# Patient Record
Sex: FEMALE | Race: BLACK OR AFRICAN AMERICAN | NOT HISPANIC OR LATINO
[De-identification: names, ages, dates, MRNs, and addresses within clinical notes are randomized per-mention and may not be internally consistent; named-entity substitution may affect disease eponyms.]

---

## 2022-08-12 ENCOUNTER — APPOINTMENT (OUTPATIENT)
Dept: COLORECTAL SURGERY | Facility: CLINIC | Age: 57
End: 2022-08-12

## 2022-08-12 VITALS
TEMPERATURE: 98.7 F | OXYGEN SATURATION: 100 % | HEART RATE: 75 BPM | SYSTOLIC BLOOD PRESSURE: 151 MMHG | DIASTOLIC BLOOD PRESSURE: 82 MMHG | BODY MASS INDEX: 27.15 KG/M2 | HEIGHT: 67 IN | WEIGHT: 173 LBS

## 2022-08-12 DIAGNOSIS — K62.9 DISEASE OF ANUS AND RECTUM, UNSPECIFIED: ICD-10-CM

## 2022-08-12 DIAGNOSIS — K63.5 POLYP OF COLON: ICD-10-CM

## 2022-08-12 PROBLEM — Z00.00 ENCOUNTER FOR PREVENTIVE HEALTH EXAMINATION: Status: ACTIVE | Noted: 2022-08-12

## 2022-08-12 PROCEDURE — 99204 OFFICE O/P NEW MOD 45 MIN: CPT

## 2022-08-12 RX ORDER — DULAGLUTIDE 1.5 MG/.5ML
1.5 INJECTION, SOLUTION SUBCUTANEOUS
Refills: 0 | Status: ACTIVE | COMMUNITY

## 2022-08-12 RX ORDER — ATORVASTATIN CALCIUM 10 MG/1
10 TABLET, FILM COATED ORAL
Refills: 0 | Status: ACTIVE | COMMUNITY

## 2022-08-12 RX ORDER — OLMESARTAN MEDOXOMIL, AMLODIPINE BESYLATE AND HYDROCHLOROTHIAZIDE 20; 5; 12.5 MG/1; MG/1; MG/1
20-5-12.5 TABLET, FILM COATED ORAL
Refills: 0 | Status: ACTIVE | COMMUNITY

## 2022-08-12 NOTE — PHYSICAL EXAM
[Skin Tags] : residual hemorrhoidal skin tags were noted [Normal] : was normal [None] : there was no rectal mass  [Normal Breath Sounds] : Normal breath sounds [Normal Heart Sounds] : normal heart sounds [2+] : left 2+ [No Rash or Lesion] : No rash or lesion [Alert] : alert [Oriented to Person] : oriented to person [Oriented to Place] : oriented to place [Oriented to Time] : oriented to time [Calm] : calm [Abdomen Masses] : No abdominal masses [Abdomen Tenderness] : ~T No ~M abdominal tenderness [Excoriation] : no perianal excoriation [Fistula] : no fistulas [Wart] : no warts [Ulcer ___ cm] : no ulcers [Tender, Swollen] : nontender, non-swollen [Thrombosed] : that was not thrombosed [Stool Sample Taken] : no stool obtained on rectal exam [Gross Blood] : no gross blood [de-identified] : benign, no masss, tenderness [de-identified] : several skin tags.  the posterior tag has a white 2 x 3 mm lesion (? wart), few other skin tags, digital - no masses, tone WNL. No anoscopy done at pts request [de-identified] : possible posterior external wart on skin tag [de-identified] : NAD

## 2022-08-12 NOTE — HISTORY OF PRESENT ILLNESS
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 Multiple other polyps found (8 mm right colon and 5 mm transverse, multiple small rectal polyps of which a few were removed). Path is pending  Also has sigmoid diverticulosis.\par First C scope was 5 years ago.  BM's daily.  No blood.  Frequent BM after some foods.  No hemorroid prolapse reported but may have skin tag. \par \par \par Dad had colon cancer at age 62.  Grand mom with stomach cancer.   Brother  of lung cancer at age 61.   \par \par \par PSH\par BTL\par Hysterectomy (MIS) and BSO for fibroids\par no anorectal surgery\par \par \par PMH\par cardiac: high cholesterol, +HTN.  No angina, MI, palpitations\par pulm:  stopped smoking in , no asthma, boronchitis, PNA\par DM x 1-2 years (type 2)\par no hepatitis, PUD\par no renal stones, UTI, hematuria, no dysuria\par neuro: no TIA, Seizure, or CVA\par no heavy bleeding or healing issues\par Mammograms this year.  \par Not seen GYN recenlty\par \par \par Meds\par trulicity\par olesartan\par atorvastatin\par \par \par \par Allergy: NKDA\par \par \par

## 2022-08-12 NOTE — ASSESSMENT
[FreeTextEntry1] : Right colon neoplasm.  Path pending.  if cancer then R isela needed.  If adenoma then option for advanced scope removal exists.  In that case propose colonoscopy on table and possible ESD/EMR vs right colectomy.\par Risks and benefits discussed with patient.  Risks of bleeding (? transfusion), infection (abscess, leak, etc), medical or other surgical complications discussed.  Possible need for laparoscopy to check for perforation i(f polyp fully removed) via scope but perforation a concern. \par If final path after scope removal shows cancer then colectomy at later date might be needed.  \par R colectomy and polypectomy web site information given to patient.\par Medical clearance requested.\par \par R/o anal wart on posterior skin tag.  If possible would excise the skin tag and that ? lesion at same surgery.\par Given perioperative anti cancer section (cimetidine, green tea, milk thistle, etc.\par

## 2022-08-16 ENCOUNTER — NON-APPOINTMENT (OUTPATIENT)
Age: 57
End: 2022-08-16

## 2022-09-08 RX ORDER — NEOMYCIN SULFATE 500 MG/1
500 TABLET ORAL
Qty: 6 | Refills: 0 | Status: DISCONTINUED | COMMUNITY
Start: 2022-09-06 | End: 2022-09-08

## 2022-09-13 ENCOUNTER — APPOINTMENT (OUTPATIENT)
Dept: COLORECTAL SURGERY | Facility: HOSPITAL | Age: 57
End: 2022-09-13

## 2022-09-30 ENCOUNTER — NON-APPOINTMENT (OUTPATIENT)
Age: 57
End: 2022-09-30

## 2022-09-30 ENCOUNTER — APPOINTMENT (OUTPATIENT)
Dept: COLORECTAL SURGERY | Facility: CLINIC | Age: 57
End: 2022-09-30

## 2022-09-30 VITALS
SYSTOLIC BLOOD PRESSURE: 144 MMHG | OXYGEN SATURATION: 98 % | TEMPERATURE: 97.8 F | WEIGHT: 170.38 LBS | DIASTOLIC BLOOD PRESSURE: 84 MMHG | HEART RATE: 81 BPM | BODY MASS INDEX: 26.74 KG/M2 | HEIGHT: 67 IN

## 2022-09-30 PROCEDURE — 99213 OFFICE O/P EST LOW 20 MIN: CPT

## 2022-09-30 NOTE — HISTORY OF PRESENT ILLNESS
[FreeTextEntry1] : 56 yo woman with large cecal polyp (biopsies showed tubular adenoma) to go for colonoscopy in OR and possible ESD/EMR removal vs laparoscopic right hemicolectomy (to be done immediately if scope removal not deemed best treatment or scope removal not possible).   Here for f/u visit prior to surgery. \par Had hematology consult because the PMD would not clear the patient because of a clotting issue.  Patient saw hematologist Dr. Xavi Landa a few weeks ago and additional blood tests were done.  She is having f/u televisit with this MD in 3 days.   Here to update hx and exam.  Surgery to be scheduled soon. \par No symptoms or problems reported\par \par PSH: BTL, \par Hysterectomy and BSO\par No anorectal surgery\par \par Meds: see list\par olesartan\par trulicity\par atorvastatin\par \par Allergies: none

## 2022-09-30 NOTE — PHYSICAL EXAM
[Abdomen Masses] : No abdominal masses [Abdomen Tenderness] : ~T No ~M abdominal tenderness [Exam Deferred] : exam was deferred [JVD] : no jugular venous distention  [Thyroid] : the thyroid was abnormal [Carotid Bruits] : no carotid bruits [Normal Breath Sounds] : Normal breath sounds [Normal Heart Sounds] : normal heart sounds [2+] : left 2+ [No Rash or Lesion] : No rash or lesion [Alert] : alert [Oriented to Person] : oriented to person [Oriented to Place] : oriented to place [Oriented to Time] : oriented to time [Calm] : calm [de-identified] : benign, no masses, no tenderness, moderately obese [de-identified] : NAD [FreeTextEntry1] : Cecal polyp, large for attempted ESD/EMR vs Right hemicolectomy.\par Otherwise cleared by PMD\par Patient needs hematology clearance first.  It is pending.\par risks and benefits discussed and understood.\par Risk of infection, bleeding, other medical or surgical complications discussed.  \par All questions answered.\par To be booked soon.

## 2022-10-25 ENCOUNTER — LABORATORY RESULT (OUTPATIENT)
Age: 57
End: 2022-10-25

## 2022-10-27 ENCOUNTER — TRANSCRIPTION ENCOUNTER (OUTPATIENT)
Age: 57
End: 2022-10-27

## 2022-10-27 VITALS
DIASTOLIC BLOOD PRESSURE: 74 MMHG | SYSTOLIC BLOOD PRESSURE: 162 MMHG | HEART RATE: 69 BPM | WEIGHT: 171.74 LBS | OXYGEN SATURATION: 100 % | HEIGHT: 67 IN | TEMPERATURE: 98 F | RESPIRATION RATE: 18 BRPM

## 2022-10-27 RX ORDER — SITAGLIPTIN 50 MG/1
1 TABLET, FILM COATED ORAL
Qty: 0 | Refills: 0 | DISCHARGE

## 2022-10-27 NOTE — ASU PATIENT PROFILE, ADULT - NSICDXPASTMEDICALHX_GEN_ALL_CORE_FT
PAST MEDICAL HISTORY:  Anal lesion     DM (diabetes mellitus)     History of blood clotting disorder     HTN (hypertension)      PAST MEDICAL HISTORY:  Anal lesion     DM (diabetes mellitus)     HTN (hypertension)

## 2022-10-27 NOTE — H&P ADULT - ASSESSMENT
This is a 58yo lady with a large adenoma polyp in the cecum. She is here for EMR/ESD and possibly Lap Right hemicolectomy.  Plan  1. Heparin 5000u sc  2. Consent  3. Type/Screen  4. ERAS postop

## 2022-10-27 NOTE — H&P ADULT - REASON FOR ADMISSION
Elective admission for Colonoscopy and EMR/ESD  , possibly Laparoscopic Right Hemicolectomy for large cecal polyp.

## 2022-10-27 NOTE — H&P ADULT - NSHPPHYSICALEXAM_GEN_ALL_CORE
Gastrointestinal: No abdominal masses. No abdominal tenderness. benign, no masses, no tenderness, moderately obese.   Rectal : exam was deferred.   General Appearance: NAD.   Neck: no jugular venous distention and the thyroid was abnormal . no carotid bruits.   Respiratory: Normal breath sounds.   Cardiovascular: normal heart sounds. Dorsalis pedis: right 2+ and left 2+.   Skin:. no rash or lesion.   Neurologic: alert, oriented to person, oriented to place and oriented to time.   Psychiatric: calm

## 2022-10-27 NOTE — PRE-OP CHECKLIST - SELECT TESTS ORDERED
Colon Biopsy, Colonoscopy,/CBC/CMP/PT/PTT/INR/Type and Screen/Urinalysis/EKG/CXR/COVID-19 Colon Biopsy, Colonoscopy,/CBC/CMP/PT/PTT/INR/Type and Screen/Urinalysis/EKG/CXR/POCT Blood Glucose/COVID-19

## 2022-10-27 NOTE — H&P ADULT - HISTORY OF PRESENT ILLNESS
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 Her last scope was 5 years ago. Recent colonoscopy found  multiple other polyps found (8 mm right colon and 5 mm transverse, multiple small rectal polyps of which a few were removed). Path is adenoma. Also has sigmoid diverticulosis.  As per GI  Dr. Izaguirre  the right colon polyp was 1/2 to 2/3 the circumference of the colon and the biopsies showed adenoma.    Her BM's daily. No blood. Frequent BM after some foods. No hemorroid prolapse reported but may have skin tag.     She has seen Dr Landa, Hematologist due to clotting issue preoperatively.

## 2022-10-28 ENCOUNTER — APPOINTMENT (OUTPATIENT)
Dept: COLORECTAL SURGERY | Facility: HOSPITAL | Age: 57
End: 2022-10-28

## 2022-10-28 ENCOUNTER — TRANSCRIPTION ENCOUNTER (OUTPATIENT)
Age: 57
End: 2022-10-28

## 2022-10-28 ENCOUNTER — INPATIENT (INPATIENT)
Facility: HOSPITAL | Age: 57
LOS: 2 days | Discharge: HOME | DRG: 330 | End: 2022-10-31
Attending: COLON & RECTAL SURGERY | Admitting: COLON & RECTAL SURGERY
Payer: COMMERCIAL

## 2022-10-28 ENCOUNTER — RESULT REVIEW (OUTPATIENT)
Age: 57
End: 2022-10-28

## 2022-10-28 DIAGNOSIS — Z90.710 ACQUIRED ABSENCE OF BOTH CERVIX AND UTERUS: Chronic | ICD-10-CM

## 2022-10-28 DIAGNOSIS — Z98.890 OTHER SPECIFIED POSTPROCEDURAL STATES: Chronic | ICD-10-CM

## 2022-10-28 LAB
ANION GAP SERPL CALC-SCNC: 13 MMOL/L — SIGNIFICANT CHANGE UP (ref 5–17)
BLD GP AB SCN SERPL QL: NEGATIVE — SIGNIFICANT CHANGE UP
BUN SERPL-MCNC: 10 MG/DL — SIGNIFICANT CHANGE UP (ref 7–23)
CALCIUM SERPL-MCNC: 9.2 MG/DL — SIGNIFICANT CHANGE UP (ref 8.4–10.5)
CHLORIDE SERPL-SCNC: 102 MMOL/L — SIGNIFICANT CHANGE UP (ref 96–108)
CO2 SERPL-SCNC: 26 MMOL/L — SIGNIFICANT CHANGE UP (ref 22–31)
CREAT SERPL-MCNC: 0.7 MG/DL — SIGNIFICANT CHANGE UP (ref 0.5–1.3)
EGFR: 100 ML/MIN/1.73M2 — SIGNIFICANT CHANGE UP
GLUCOSE BLDC GLUCOMTR-MCNC: 109 MG/DL — HIGH (ref 70–99)
GLUCOSE BLDC GLUCOMTR-MCNC: 184 MG/DL — HIGH (ref 70–99)
GLUCOSE SERPL-MCNC: 191 MG/DL — HIGH (ref 70–99)
HCT VFR BLD CALC: 40.8 % — SIGNIFICANT CHANGE UP (ref 34.5–45)
HGB BLD-MCNC: 13.3 G/DL — SIGNIFICANT CHANGE UP (ref 11.5–15.5)
MCHC RBC-ENTMCNC: 30.8 PG — SIGNIFICANT CHANGE UP (ref 27–34)
MCHC RBC-ENTMCNC: 32.6 GM/DL — SIGNIFICANT CHANGE UP (ref 32–36)
MCV RBC AUTO: 94.4 FL — SIGNIFICANT CHANGE UP (ref 80–100)
NRBC # BLD: 0 /100 WBCS — SIGNIFICANT CHANGE UP (ref 0–0)
PLATELET # BLD AUTO: 173 K/UL — SIGNIFICANT CHANGE UP (ref 150–400)
POTASSIUM SERPL-MCNC: 4.2 MMOL/L — SIGNIFICANT CHANGE UP (ref 3.5–5.3)
POTASSIUM SERPL-SCNC: 4.2 MMOL/L — SIGNIFICANT CHANGE UP (ref 3.5–5.3)
RBC # BLD: 4.32 M/UL — SIGNIFICANT CHANGE UP (ref 3.8–5.2)
RBC # FLD: 12.6 % — SIGNIFICANT CHANGE UP (ref 10.3–14.5)
RH IG SCN BLD-IMP: POSITIVE — SIGNIFICANT CHANGE UP
SODIUM SERPL-SCNC: 141 MMOL/L — SIGNIFICANT CHANGE UP (ref 135–145)
WBC # BLD: 12 K/UL — HIGH (ref 3.8–10.5)
WBC # FLD AUTO: 12 K/UL — HIGH (ref 3.8–10.5)

## 2022-10-28 PROCEDURE — 45378 DIAGNOSTIC COLONOSCOPY: CPT

## 2022-10-28 PROCEDURE — 88309 TISSUE EXAM BY PATHOLOGIST: CPT | Mod: 26

## 2022-10-28 PROCEDURE — 44205 LAP COLECTOMY PART W/ILEUM: CPT | Mod: 22

## 2022-10-28 DEVICE — STAPLER COVIDIEN ENDO GIA 80-3.8MM BLUE RELOAD: Type: IMPLANTABLE DEVICE | Status: FUNCTIONAL

## 2022-10-28 DEVICE — STAPLER COVIDIEN ENDO GIA 80-3.8MM BLUE: Type: IMPLANTABLE DEVICE | Status: FUNCTIONAL

## 2022-10-28 RX ORDER — HEPARIN SODIUM 5000 [USP'U]/ML
5000 INJECTION INTRAVENOUS; SUBCUTANEOUS ONCE
Refills: 0 | Status: DISCONTINUED | OUTPATIENT
Start: 2022-10-28 | End: 2022-10-28

## 2022-10-28 RX ORDER — HEPARIN SODIUM 5000 [USP'U]/ML
5000 INJECTION INTRAVENOUS; SUBCUTANEOUS EVERY 12 HOURS
Refills: 0 | Status: DISCONTINUED | OUTPATIENT
Start: 2022-10-28 | End: 2022-10-31

## 2022-10-28 RX ORDER — ACETAMINOPHEN 500 MG
1000 TABLET ORAL ONCE
Refills: 0 | Status: COMPLETED | OUTPATIENT
Start: 2022-10-28 | End: 2022-10-28

## 2022-10-28 RX ORDER — ACETAMINOPHEN 500 MG
1000 TABLET ORAL ONCE
Refills: 0 | Status: DISCONTINUED | OUTPATIENT
Start: 2022-10-28 | End: 2022-10-28

## 2022-10-28 RX ORDER — HEPARIN SODIUM 5000 [USP'U]/ML
5000 INJECTION INTRAVENOUS; SUBCUTANEOUS ONCE
Refills: 0 | Status: COMPLETED | OUTPATIENT
Start: 2022-10-28 | End: 2022-10-28

## 2022-10-28 RX ORDER — ACETAMINOPHEN 500 MG
1000 TABLET ORAL EVERY 6 HOURS
Refills: 0 | Status: DISCONTINUED | OUTPATIENT
Start: 2022-10-28 | End: 2022-10-28

## 2022-10-28 RX ORDER — HYDROMORPHONE HYDROCHLORIDE 2 MG/ML
0.5 INJECTION INTRAMUSCULAR; INTRAVENOUS; SUBCUTANEOUS ONCE
Refills: 0 | Status: DISCONTINUED | OUTPATIENT
Start: 2022-10-28 | End: 2022-10-28

## 2022-10-28 RX ORDER — HYDROMORPHONE HYDROCHLORIDE 2 MG/ML
0.5 INJECTION INTRAMUSCULAR; INTRAVENOUS; SUBCUTANEOUS EVERY 4 HOURS
Refills: 0 | Status: DISCONTINUED | OUTPATIENT
Start: 2022-10-28 | End: 2022-10-29

## 2022-10-28 RX ORDER — ACETAMINOPHEN 500 MG
1000 TABLET ORAL EVERY 6 HOURS
Refills: 0 | Status: DISCONTINUED | OUTPATIENT
Start: 2022-10-28 | End: 2022-10-31

## 2022-10-28 RX ORDER — HEPARIN SODIUM 5000 [USP'U]/ML
5000 INJECTION INTRAVENOUS; SUBCUTANEOUS EVERY 12 HOURS
Refills: 0 | Status: DISCONTINUED | OUTPATIENT
Start: 2022-10-28 | End: 2022-10-28

## 2022-10-28 RX ORDER — DULAGLUTIDE 4.5 MG/.5ML
0 INJECTION, SOLUTION SUBCUTANEOUS
Qty: 0 | Refills: 0 | DISCHARGE

## 2022-10-28 RX ORDER — ATORVASTATIN CALCIUM 80 MG/1
1 TABLET, FILM COATED ORAL
Qty: 0 | Refills: 0 | DISCHARGE

## 2022-10-28 RX ORDER — OLMESARTAN MEDOXOMIL / AMLODIPINE BESYLATE / HYDROCHLOROTHIAZIDE 40; 10; 25 MG/1; MG/1; MG/1
1 TABLET, FILM COATED ORAL
Qty: 0 | Refills: 0 | DISCHARGE

## 2022-10-28 RX ORDER — SODIUM CHLORIDE 9 MG/ML
1000 INJECTION, SOLUTION INTRAVENOUS
Refills: 0 | Status: DISCONTINUED | OUTPATIENT
Start: 2022-10-28 | End: 2022-10-28

## 2022-10-28 RX ADMIN — Medication 1000 MILLIGRAM(S): at 23:06

## 2022-10-28 RX ADMIN — Medication 1000 MILLIGRAM(S): at 17:15

## 2022-10-28 RX ADMIN — Medication 400 MILLIGRAM(S): at 16:42

## 2022-10-28 RX ADMIN — HYDROMORPHONE HYDROCHLORIDE 0.5 MILLIGRAM(S): 2 INJECTION INTRAMUSCULAR; INTRAVENOUS; SUBCUTANEOUS at 19:01

## 2022-10-28 RX ADMIN — Medication 1000 MILLIGRAM(S): at 07:36

## 2022-10-28 RX ADMIN — HYDROMORPHONE HYDROCHLORIDE 0.5 MILLIGRAM(S): 2 INJECTION INTRAMUSCULAR; INTRAVENOUS; SUBCUTANEOUS at 16:25

## 2022-10-28 RX ADMIN — HEPARIN SODIUM 5000 UNIT(S): 5000 INJECTION INTRAVENOUS; SUBCUTANEOUS at 07:37

## 2022-10-28 RX ADMIN — HYDROMORPHONE HYDROCHLORIDE 0.5 MILLIGRAM(S): 2 INJECTION INTRAMUSCULAR; INTRAVENOUS; SUBCUTANEOUS at 18:53

## 2022-10-28 RX ADMIN — HYDROMORPHONE HYDROCHLORIDE 0.5 MILLIGRAM(S): 2 INJECTION INTRAMUSCULAR; INTRAVENOUS; SUBCUTANEOUS at 16:40

## 2022-10-28 RX ADMIN — HEPARIN SODIUM 5000 UNIT(S): 5000 INJECTION INTRAVENOUS; SUBCUTANEOUS at 22:37

## 2022-10-28 RX ADMIN — HYDROMORPHONE HYDROCHLORIDE 0.5 MILLIGRAM(S): 2 INJECTION INTRAMUSCULAR; INTRAVENOUS; SUBCUTANEOUS at 22:51

## 2022-10-28 RX ADMIN — HYDROMORPHONE HYDROCHLORIDE 0.5 MILLIGRAM(S): 2 INJECTION INTRAMUSCULAR; INTRAVENOUS; SUBCUTANEOUS at 22:36

## 2022-10-28 NOTE — BRIEF OPERATIVE NOTE - NSICDXBRIEFPREOP_GEN_ALL_CORE_FT
PRE-OP DIAGNOSIS:  Cecal polyp 28-Oct-2022 15:05:20  Lion Banks  Cecal polyp 28-Oct-2022 15:05:24  Lion Banks

## 2022-10-28 NOTE — BRIEF OPERATIVE NOTE - OPERATION/FINDINGS
Veress needle single pass. Pneumoperitoneum. Under direction visualisation 5mm ports x2  inserted  in right side abd.  Small bowel adherent to anterior adominal wall. This was taken down  carefully. Small serosa tear made inadvertently. Once small bowel released from anterior abd wall, rest of ports inserted. Adhesiolysis of small bowel performed.  Patient positioned. We started off with lateral dissection up to level of duodenum. We then  focus on medial dissection towards hepatic flexure. IC pedicle was identified and divided with ligasure. Omentum detached from transverse colon. We dissected the transverse colon towards hepatic flexure. We entered the lesser sac and dissection proceeded along transverse mesocolon.   Mesocolon was divided taking right branch of MCA. Small bowel mesentery divided with ligasure towards terminal ileum.  Upper midline incision made. Bowel exteriorised. Small serosa tear in small bowel oversewn. Side-side isoperistaltic ileocolic anastomosis  performed with liner stapler. Stapler line oversewn at bleeding points. Common enterotomy / bowel was then divided with linear stapler x2 loads. Stapler line minor ooze , oversewn.  We then re establish pneumoperitoneum to ensure the small bowel was not twisted.  Washout performed. Hemostasis satisfactory. Upper midline closed in usual fashion. Local infiltrated. Staplers to skin.

## 2022-10-28 NOTE — BRIEF OPERATIVE NOTE - NSICDXBRIEFPROCEDURE_GEN_ALL_CORE_FT
PROCEDURES:  Laparoscopic right colectomy 28-Oct-2022 15:01:51  Lion Banks  Lysis, adhesions, intestine 28-Oct-2022 15:03:22  Lion Banks  Colonoscopy 28-Oct-2022 15:03:39  Lion Banks

## 2022-10-28 NOTE — PROGRESS NOTE ADULT - SUBJECTIVE AND OBJECTIVE BOX
General Surgery Post op Check    Pt seen and examined without complaints. Pain is controlled. Denies SOB/CP/N/V.     Vital Signs Last 24 Hrs  T(C): 36.9 (28 Oct 2022 18:45), Max: 36.9 (28 Oct 2022 18:45)  T(F): 98.5 (28 Oct 2022 18:45), Max: 98.5 (28 Oct 2022 18:45)  HR: 77 (28 Oct 2022 18:45) (64 - 80)  BP: 137/69 (28 Oct 2022 18:45) (137/69 - 168/72)  BP(mean): 96 (28 Oct 2022 17:58) (70 - 111)  RR: 16 (28 Oct 2022 18:45) (13 - 24)  SpO2: 100% (28 Oct 2022 18:45) (100% - 100%)    Parameters below as of 28 Oct 2022 18:45  Patient On (Oxygen Delivery Method): mask, nonrebreather,ERAS         I&O's Summary    28 Oct 2022 07:01  -  28 Oct 2022 18:50  --------------------------------------------------------  IN: 225 mL / OUT: 550 mL / NET: -325 mL        Physical Exam  Gen: NAD, A&Ox3  Pulm: No respiratory distress, no subcostal retractions  CV: RRR, no JVD  Abd: Soft, NTND, incisions c/d/i  Extremities:  Warm and well perfused, no edema

## 2022-10-29 LAB
ANION GAP SERPL CALC-SCNC: 8 MMOL/L — SIGNIFICANT CHANGE UP (ref 5–17)
BASOPHILS # BLD AUTO: 0.03 K/UL — SIGNIFICANT CHANGE UP (ref 0–0.2)
BASOPHILS NFR BLD AUTO: 0.3 % — SIGNIFICANT CHANGE UP (ref 0–2)
BUN SERPL-MCNC: 13 MG/DL — SIGNIFICANT CHANGE UP (ref 7–23)
CALCIUM SERPL-MCNC: 8.5 MG/DL — SIGNIFICANT CHANGE UP (ref 8.4–10.5)
CHLORIDE SERPL-SCNC: 106 MMOL/L — SIGNIFICANT CHANGE UP (ref 96–108)
CO2 SERPL-SCNC: 29 MMOL/L — SIGNIFICANT CHANGE UP (ref 22–31)
CREAT SERPL-MCNC: 0.89 MG/DL — SIGNIFICANT CHANGE UP (ref 0.5–1.3)
EGFR: 75 ML/MIN/1.73M2 — SIGNIFICANT CHANGE UP
EOSINOPHIL # BLD AUTO: 0 K/UL — SIGNIFICANT CHANGE UP (ref 0–0.5)
EOSINOPHIL NFR BLD AUTO: 0 % — SIGNIFICANT CHANGE UP (ref 0–6)
GLUCOSE SERPL-MCNC: 120 MG/DL — HIGH (ref 70–99)
HCT VFR BLD CALC: 33 % — LOW (ref 34.5–45)
HCT VFR BLD CALC: 35.5 % — SIGNIFICANT CHANGE UP (ref 34.5–45)
HGB BLD-MCNC: 10.9 G/DL — LOW (ref 11.5–15.5)
HGB BLD-MCNC: 11.7 G/DL — SIGNIFICANT CHANGE UP (ref 11.5–15.5)
IMM GRANULOCYTES NFR BLD AUTO: 0.3 % — SIGNIFICANT CHANGE UP (ref 0–0.9)
LYMPHOCYTES # BLD AUTO: 1.63 K/UL — SIGNIFICANT CHANGE UP (ref 1–3.3)
LYMPHOCYTES # BLD AUTO: 15.9 % — SIGNIFICANT CHANGE UP (ref 13–44)
MAGNESIUM SERPL-MCNC: 2.2 MG/DL — SIGNIFICANT CHANGE UP (ref 1.6–2.6)
MCHC RBC-ENTMCNC: 31.1 PG — SIGNIFICANT CHANGE UP (ref 27–34)
MCHC RBC-ENTMCNC: 31.1 PG — SIGNIFICANT CHANGE UP (ref 27–34)
MCHC RBC-ENTMCNC: 33 GM/DL — SIGNIFICANT CHANGE UP (ref 32–36)
MCHC RBC-ENTMCNC: 33 GM/DL — SIGNIFICANT CHANGE UP (ref 32–36)
MCV RBC AUTO: 94 FL — SIGNIFICANT CHANGE UP (ref 80–100)
MCV RBC AUTO: 94.4 FL — SIGNIFICANT CHANGE UP (ref 80–100)
MONOCYTES # BLD AUTO: 0.58 K/UL — SIGNIFICANT CHANGE UP (ref 0–0.9)
MONOCYTES NFR BLD AUTO: 5.7 % — SIGNIFICANT CHANGE UP (ref 2–14)
NEUTROPHILS # BLD AUTO: 7.96 K/UL — HIGH (ref 1.8–7.4)
NEUTROPHILS NFR BLD AUTO: 77.8 % — HIGH (ref 43–77)
NRBC # BLD: 0 /100 WBCS — SIGNIFICANT CHANGE UP (ref 0–0)
NRBC # BLD: 0 /100 WBCS — SIGNIFICANT CHANGE UP (ref 0–0)
PHOSPHATE SERPL-MCNC: 3.5 MG/DL — SIGNIFICANT CHANGE UP (ref 2.5–4.5)
PLATELET # BLD AUTO: 156 K/UL — SIGNIFICANT CHANGE UP (ref 150–400)
PLATELET # BLD AUTO: 157 K/UL — SIGNIFICANT CHANGE UP (ref 150–400)
POTASSIUM SERPL-MCNC: 4.2 MMOL/L — SIGNIFICANT CHANGE UP (ref 3.5–5.3)
POTASSIUM SERPL-SCNC: 4.2 MMOL/L — SIGNIFICANT CHANGE UP (ref 3.5–5.3)
RBC # BLD: 3.51 M/UL — LOW (ref 3.8–5.2)
RBC # BLD: 3.76 M/UL — LOW (ref 3.8–5.2)
RBC # FLD: 12.9 % — SIGNIFICANT CHANGE UP (ref 10.3–14.5)
RBC # FLD: 12.9 % — SIGNIFICANT CHANGE UP (ref 10.3–14.5)
SODIUM SERPL-SCNC: 143 MMOL/L — SIGNIFICANT CHANGE UP (ref 135–145)
WBC # BLD: 10.23 K/UL — SIGNIFICANT CHANGE UP (ref 3.8–10.5)
WBC # BLD: 10.41 K/UL — SIGNIFICANT CHANGE UP (ref 3.8–10.5)
WBC # FLD AUTO: 10.23 K/UL — SIGNIFICANT CHANGE UP (ref 3.8–10.5)
WBC # FLD AUTO: 10.41 K/UL — SIGNIFICANT CHANGE UP (ref 3.8–10.5)

## 2022-10-29 RX ORDER — KETOROLAC TROMETHAMINE 30 MG/ML
15 SYRINGE (ML) INJECTION EVERY 6 HOURS
Refills: 0 | Status: DISCONTINUED | OUTPATIENT
Start: 2022-10-29 | End: 2022-10-31

## 2022-10-29 RX ORDER — SODIUM CHLORIDE 9 MG/ML
1000 INJECTION, SOLUTION INTRAVENOUS
Refills: 0 | Status: DISCONTINUED | OUTPATIENT
Start: 2022-10-29 | End: 2022-10-29

## 2022-10-29 RX ADMIN — Medication 15 MILLIGRAM(S): at 17:34

## 2022-10-29 RX ADMIN — HEPARIN SODIUM 5000 UNIT(S): 5000 INJECTION INTRAVENOUS; SUBCUTANEOUS at 10:45

## 2022-10-29 RX ADMIN — HYDROMORPHONE HYDROCHLORIDE 0.5 MILLIGRAM(S): 2 INJECTION INTRAMUSCULAR; INTRAVENOUS; SUBCUTANEOUS at 06:55

## 2022-10-29 RX ADMIN — Medication 1000 MILLIGRAM(S): at 07:52

## 2022-10-29 RX ADMIN — Medication 15 MILLIGRAM(S): at 17:19

## 2022-10-29 RX ADMIN — Medication 1000 MILLIGRAM(S): at 06:52

## 2022-10-29 RX ADMIN — Medication 15 MILLIGRAM(S): at 23:09

## 2022-10-29 RX ADMIN — Medication 1000 MILLIGRAM(S): at 18:19

## 2022-10-29 RX ADMIN — SODIUM CHLORIDE 50 MILLILITER(S): 9 INJECTION, SOLUTION INTRAVENOUS at 03:28

## 2022-10-29 RX ADMIN — Medication 1000 MILLIGRAM(S): at 11:18

## 2022-10-29 RX ADMIN — HYDROMORPHONE HYDROCHLORIDE 0.5 MILLIGRAM(S): 2 INJECTION INTRAMUSCULAR; INTRAVENOUS; SUBCUTANEOUS at 07:52

## 2022-10-29 RX ADMIN — HEPARIN SODIUM 5000 UNIT(S): 5000 INJECTION INTRAVENOUS; SUBCUTANEOUS at 21:08

## 2022-10-29 RX ADMIN — Medication 15 MILLIGRAM(S): at 11:33

## 2022-10-29 RX ADMIN — Medication 1000 MILLIGRAM(S): at 00:06

## 2022-10-29 RX ADMIN — Medication 15 MILLIGRAM(S): at 11:18

## 2022-10-29 RX ADMIN — SODIUM CHLORIDE 40 MILLILITER(S): 9 INJECTION, SOLUTION INTRAVENOUS at 09:19

## 2022-10-29 RX ADMIN — Medication 1000 MILLIGRAM(S): at 23:09

## 2022-10-29 RX ADMIN — Medication 1000 MILLIGRAM(S): at 17:19

## 2022-10-29 RX ADMIN — Medication 15 MILLIGRAM(S): at 23:25

## 2022-10-29 RX ADMIN — Medication 1000 MILLIGRAM(S): at 12:18

## 2022-10-29 NOTE — PROGRESS NOTE ADULT - SUBJECTIVE AND OBJECTIVE BOX
POD 1 s/p R hemicolectomy and THALIA and repair of serosal tear.  9 Uris    OOB and walked in hallway once this AM  Using spirometer and coughing  No flatus or BM yet.  No N/V.  Some abdominal pain.  Taking tylenol and dilaudid for pain.  Yoder just removed - no void yet.    Vital Signs Last 24 Hrs  T(C): 37.3 (29 Oct 2022 08:05), Max: 37.3 (29 Oct 2022 08:05)  T(F): 99.2 (29 Oct 2022 08:05), Max: 99.2 (29 Oct 2022 08:05)  HR: 67 (29 Oct 2022 08:05) (64 - 80)  BP: 120/68 (29 Oct 2022 08:05) (110/70 - 168/72)  BP(mean): 96 (28 Oct 2022 17:58) (70 - 111)  RR: 17 (29 Oct 2022 08:05) (13 - 24)  SpO2: 97% (29 Oct 2022 08:05) (97% - 100%)    Parameters below as of 29 Oct 2022 08:05  Patient On (Oxygen Delivery Method): room air    lungs: clear bilaterally  cor S12  abd:  obese and mildly distended, BS few. dressings intact, has penroses in main wound, dressing changed last night  ext: without calf tenderness     last shift, 200 ml prior to yoder out this AM.  IV had been 75 ml/hr, now 50 ml/hr                          10.9   10.23 )-----------( 157      ( 29 Oct 2022 08:20 )             33.0   10-29    143  |  106  |  13  ----------------------------<  120<H>  4.2   |  29  |  0.89    Ca    8.5      29 Oct 2022 08:20  Phos  3.5     10-29  Mg     2.2     10-29

## 2022-10-29 NOTE — PROGRESS NOTE ADULT - SUBJECTIVE AND OBJECTIVE BOX
STATUS POST:  Laparoscopic right colectomy, Lysis, adhesions, intestine, Colonoscopy  POST OPERATIVE DAY #: 1    SUBJECTIVE: Patient seen and examined bedside with chief resident. Patient states pain is controlled. Denied n/v. -F/BM.     Vital Signs Last 24 Hrs  T(C): 36.7 (29 Oct 2022 13:44), Max: 37.3 (29 Oct 2022 08:05)  T(F): 98.1 (29 Oct 2022 13:44), Max: 99.2 (29 Oct 2022 08:05)  HR: 63 (29 Oct 2022 13:44) (63 - 78)  BP: 126/73 (29 Oct 2022 13:44) (110/70 - 132/81)  BP(mean): --  RR: 17 (29 Oct 2022 13:44) (16 - 18)  SpO2: 99% (29 Oct 2022 13:44) (97% - 99%)    Parameters below as of 29 Oct 2022 13:44  Patient On (Oxygen Delivery Method): room air        I&O's Summary    28 Oct 2022 07:01  -  29 Oct 2022 07:00  --------------------------------------------------------  IN: 1050 mL / OUT: 550 mL / NET: 500 mL    29 Oct 2022 07:01  -  29 Oct 2022 18:46  --------------------------------------------------------  IN: 670 mL / OUT: 1175 mL / NET: -505 mL        Physical Exam:  General Appearance: AAOx3, NAD  Pulmonary: Nonlabored breathing, no respiratory distress  Cardiovascular: NSR  Abdomen: Soft, mildly distended, appropriate incisional tenderness  Extremities: WWP, SCD's in place, no edema     LABS:                        11.7   10.41 )-----------( 156      ( 29 Oct 2022 15:49 )             35.5     10-29    143  |  106  |  13  ----------------------------<  120<H>  4.2   |  29  |  0.89    Ca    8.5      29 Oct 2022 08:20  Phos  3.5     10-29  Mg     2.2     10-29

## 2022-10-30 LAB
ANION GAP SERPL CALC-SCNC: 10 MMOL/L — SIGNIFICANT CHANGE UP (ref 5–17)
BUN SERPL-MCNC: 13 MG/DL — SIGNIFICANT CHANGE UP (ref 7–23)
CALCIUM SERPL-MCNC: 8.8 MG/DL — SIGNIFICANT CHANGE UP (ref 8.4–10.5)
CHLORIDE SERPL-SCNC: 106 MMOL/L — SIGNIFICANT CHANGE UP (ref 96–108)
CO2 SERPL-SCNC: 25 MMOL/L — SIGNIFICANT CHANGE UP (ref 22–31)
CREAT SERPL-MCNC: 0.79 MG/DL — SIGNIFICANT CHANGE UP (ref 0.5–1.3)
EGFR: 86 ML/MIN/1.73M2 — SIGNIFICANT CHANGE UP
GLUCOSE SERPL-MCNC: 104 MG/DL — HIGH (ref 70–99)
HCT VFR BLD CALC: 31.4 % — LOW (ref 34.5–45)
HGB BLD-MCNC: 10.3 G/DL — LOW (ref 11.5–15.5)
MAGNESIUM SERPL-MCNC: 3.1 MG/DL — HIGH (ref 1.6–2.6)
MCHC RBC-ENTMCNC: 31.1 PG — SIGNIFICANT CHANGE UP (ref 27–34)
MCHC RBC-ENTMCNC: 32.8 GM/DL — SIGNIFICANT CHANGE UP (ref 32–36)
MCV RBC AUTO: 94.9 FL — SIGNIFICANT CHANGE UP (ref 80–100)
NRBC # BLD: 0 /100 WBCS — SIGNIFICANT CHANGE UP (ref 0–0)
PHOSPHATE SERPL-MCNC: 2.3 MG/DL — LOW (ref 2.5–4.5)
PLATELET # BLD AUTO: 144 K/UL — LOW (ref 150–400)
POTASSIUM SERPL-MCNC: 3.8 MMOL/L — SIGNIFICANT CHANGE UP (ref 3.5–5.3)
POTASSIUM SERPL-SCNC: 3.8 MMOL/L — SIGNIFICANT CHANGE UP (ref 3.5–5.3)
RBC # BLD: 3.31 M/UL — LOW (ref 3.8–5.2)
RBC # FLD: 13 % — SIGNIFICANT CHANGE UP (ref 10.3–14.5)
SODIUM SERPL-SCNC: 141 MMOL/L — SIGNIFICANT CHANGE UP (ref 135–145)
WBC # BLD: 8.99 K/UL — SIGNIFICANT CHANGE UP (ref 3.8–10.5)
WBC # FLD AUTO: 8.99 K/UL — SIGNIFICANT CHANGE UP (ref 3.8–10.5)

## 2022-10-30 RX ORDER — POTASSIUM PHOSPHATE, MONOBASIC POTASSIUM PHOSPHATE, DIBASIC 236; 224 MG/ML; MG/ML
15 INJECTION, SOLUTION INTRAVENOUS ONCE
Refills: 0 | Status: COMPLETED | OUTPATIENT
Start: 2022-10-30 | End: 2022-10-30

## 2022-10-30 RX ADMIN — Medication 15 MILLIGRAM(S): at 05:35

## 2022-10-30 RX ADMIN — Medication 15 MILLIGRAM(S): at 22:00

## 2022-10-30 RX ADMIN — Medication 1000 MILLIGRAM(S): at 06:20

## 2022-10-30 RX ADMIN — Medication 1000 MILLIGRAM(S): at 21:59

## 2022-10-30 RX ADMIN — Medication 15 MILLIGRAM(S): at 05:20

## 2022-10-30 RX ADMIN — Medication 1000 MILLIGRAM(S): at 00:10

## 2022-10-30 RX ADMIN — POTASSIUM PHOSPHATE, MONOBASIC POTASSIUM PHOSPHATE, DIBASIC 62.5 MILLIMOLE(S): 236; 224 INJECTION, SOLUTION INTRAVENOUS at 11:38

## 2022-10-30 RX ADMIN — HEPARIN SODIUM 5000 UNIT(S): 5000 INJECTION INTRAVENOUS; SUBCUTANEOUS at 21:59

## 2022-10-30 RX ADMIN — Medication 15 MILLIGRAM(S): at 22:15

## 2022-10-30 RX ADMIN — HEPARIN SODIUM 5000 UNIT(S): 5000 INJECTION INTRAVENOUS; SUBCUTANEOUS at 10:38

## 2022-10-30 RX ADMIN — Medication 1000 MILLIGRAM(S): at 05:21

## 2022-10-30 RX ADMIN — Medication 1000 MILLIGRAM(S): at 22:59

## 2022-10-30 NOTE — PROGRESS NOTE ADULT - SUBJECTIVE AND OBJECTIVE BOX
GENERAL SURGERY PROGRESS NOTE    SUBJECTIVE: Patient seen and examined bedside.    heparin   Injectable 5000 Unit(s) SubCutaneous every 12 hours      Vital Signs Last 24 Hrs  T(C): 36.9 (30 Oct 2022 08:15), Max: 37.4 (29 Oct 2022 20:24)  T(F): 98.5 (30 Oct 2022 08:15), Max: 99.4 (29 Oct 2022 20:24)  HR: 67 (30 Oct 2022 08:15) (63 - 78)  BP: 139/81 (30 Oct 2022 08:15) (126/73 - 154/71)  BP(mean): --  RR: 17 (30 Oct 2022 08:15) (17 - 18)  SpO2: 99% (30 Oct 2022 08:15) (97% - 99%)    Parameters below as of 30 Oct 2022 08:15  Patient On (Oxygen Delivery Method): room air      I&O's Detail    29 Oct 2022 07:01  -  30 Oct 2022 07:00  --------------------------------------------------------  IN:    Lactated Ringers: 570 mL    Oral Fluid: 180 mL  Total IN: 750 mL    OUT:    Indwelling Catheter - Urethral (mL): 800 mL    Voided (mL): 1125 mL  Total OUT: 1925 mL    Total NET: -1175 mL      30 Oct 2022 07:01  -  30 Oct 2022 12:18  --------------------------------------------------------  IN:  Total IN: 0 mL    OUT:    Voided (mL): 300 mL  Total OUT: 300 mL    Total NET: -300 mL          PHYSICAL EXAM    General: NAD, resting comfortably in bed  C/V: NSR  Pulm: Nonlabored breathing, no respiratory distress on room air  Abd: soft, ND, appropriate incisional tenderness, no rebound tenderness, no guarding  Extrem: WWP, no edema, SCDs in place        LABS:                        10.3   8.99  )-----------( 144      ( 30 Oct 2022 06:03 )             31.4     10-30    141  |  106  |  13  ----------------------------<  104<H>  3.8   |  25  |  0.79    Ca    8.8      30 Oct 2022 06:03  Phos  2.3     10-30  Mg     3.1     10-30            RADIOLOGY & ADDITIONAL STUDIES:   GENERAL SURGERY PROGRESS NOTE    SUBJECTIVE: Patient seen and examined bedside. Pt states that pain is controlled. She endorses having flatus and liquid BMs. Pt feels hungry and would like to eat solids.  No acute complaints.     heparin   Injectable 5000 Unit(s) SubCutaneous every 12 hours      Vital Signs Last 24 Hrs  T(C): 36.9 (30 Oct 2022 08:15), Max: 37.4 (29 Oct 2022 20:24)  T(F): 98.5 (30 Oct 2022 08:15), Max: 99.4 (29 Oct 2022 20:24)  HR: 67 (30 Oct 2022 08:15) (63 - 78)  BP: 139/81 (30 Oct 2022 08:15) (126/73 - 154/71)  BP(mean): --  RR: 17 (30 Oct 2022 08:15) (17 - 18)  SpO2: 99% (30 Oct 2022 08:15) (97% - 99%)    Parameters below as of 30 Oct 2022 08:15  Patient On (Oxygen Delivery Method): room air      I&O's Detail    29 Oct 2022 07:01  -  30 Oct 2022 07:00  --------------------------------------------------------  IN:    Lactated Ringers: 570 mL    Oral Fluid: 180 mL  Total IN: 750 mL    OUT:    Indwelling Catheter - Urethral (mL): 800 mL    Voided (mL): 1125 mL  Total OUT: 1925 mL    Total NET: -1175 mL      30 Oct 2022 07:01  -  30 Oct 2022 12:18  --------------------------------------------------------  IN:  Total IN: 0 mL    OUT:    Voided (mL): 300 mL  Total OUT: 300 mL    Total NET: -300 mL          PHYSICAL EXAM    General: NAD, resting comfortably in bed  C/V: NSR  Pulm: Nonlabored breathing, no respiratory distress on room air  Abd: soft, ND, appropriate incisional tenderness, incisions c/d/i, no rebound tenderness, no guarding  Extrem: WWP, no edema, SCDs in place        LABS:                        10.3   8.99  )-----------( 144      ( 30 Oct 2022 06:03 )             31.4     10-30    141  |  106  |  13  ----------------------------<  104<H>  3.8   |  25  |  0.79    Ca    8.8      30 Oct 2022 06:03  Phos  2.3     10-30  Mg     3.1     10-30            RADIOLOGY & ADDITIONAL STUDIES:

## 2022-10-31 ENCOUNTER — TRANSCRIPTION ENCOUNTER (OUTPATIENT)
Age: 57
End: 2022-10-31

## 2022-10-31 VITALS
RESPIRATION RATE: 18 BRPM | DIASTOLIC BLOOD PRESSURE: 86 MMHG | HEART RATE: 70 BPM | SYSTOLIC BLOOD PRESSURE: 145 MMHG | TEMPERATURE: 98 F | OXYGEN SATURATION: 96 %

## 2022-10-31 PROBLEM — I10 ESSENTIAL (PRIMARY) HYPERTENSION: Chronic | Status: ACTIVE | Noted: 2022-10-27

## 2022-10-31 PROBLEM — E11.9 TYPE 2 DIABETES MELLITUS WITHOUT COMPLICATIONS: Chronic | Status: ACTIVE | Noted: 2022-10-27

## 2022-10-31 PROBLEM — K62.9 DISEASE OF ANUS AND RECTUM, UNSPECIFIED: Chronic | Status: ACTIVE | Noted: 2022-10-27

## 2022-10-31 LAB
ANION GAP SERPL CALC-SCNC: 8 MMOL/L — SIGNIFICANT CHANGE UP (ref 5–17)
BUN SERPL-MCNC: 16 MG/DL — SIGNIFICANT CHANGE UP (ref 7–23)
CALCIUM SERPL-MCNC: 8.4 MG/DL — SIGNIFICANT CHANGE UP (ref 8.4–10.5)
CHLORIDE SERPL-SCNC: 108 MMOL/L — SIGNIFICANT CHANGE UP (ref 96–108)
CO2 SERPL-SCNC: 26 MMOL/L — SIGNIFICANT CHANGE UP (ref 22–31)
CREAT SERPL-MCNC: 0.76 MG/DL — SIGNIFICANT CHANGE UP (ref 0.5–1.3)
EGFR: 90 ML/MIN/1.73M2 — SIGNIFICANT CHANGE UP
GLUCOSE SERPL-MCNC: 143 MG/DL — HIGH (ref 70–99)
HCT VFR BLD CALC: 30.2 % — LOW (ref 34.5–45)
HGB BLD-MCNC: 10 G/DL — LOW (ref 11.5–15.5)
MAGNESIUM SERPL-MCNC: 2.4 MG/DL — SIGNIFICANT CHANGE UP (ref 1.6–2.6)
MCHC RBC-ENTMCNC: 31.5 PG — SIGNIFICANT CHANGE UP (ref 27–34)
MCHC RBC-ENTMCNC: 33.1 GM/DL — SIGNIFICANT CHANGE UP (ref 32–36)
MCV RBC AUTO: 95.3 FL — SIGNIFICANT CHANGE UP (ref 80–100)
NRBC # BLD: 0 /100 WBCS — SIGNIFICANT CHANGE UP (ref 0–0)
PHOSPHATE SERPL-MCNC: 2.8 MG/DL — SIGNIFICANT CHANGE UP (ref 2.5–4.5)
PLATELET # BLD AUTO: 161 K/UL — SIGNIFICANT CHANGE UP (ref 150–400)
POTASSIUM SERPL-MCNC: 3.8 MMOL/L — SIGNIFICANT CHANGE UP (ref 3.5–5.3)
POTASSIUM SERPL-SCNC: 3.8 MMOL/L — SIGNIFICANT CHANGE UP (ref 3.5–5.3)
RBC # BLD: 3.17 M/UL — LOW (ref 3.8–5.2)
RBC # FLD: 13 % — SIGNIFICANT CHANGE UP (ref 10.3–14.5)
SODIUM SERPL-SCNC: 142 MMOL/L — SIGNIFICANT CHANGE UP (ref 135–145)
WBC # BLD: 6.95 K/UL — SIGNIFICANT CHANGE UP (ref 3.8–10.5)
WBC # FLD AUTO: 6.95 K/UL — SIGNIFICANT CHANGE UP (ref 3.8–10.5)

## 2022-10-31 PROCEDURE — 88309 TISSUE EXAM BY PATHOLOGIST: CPT

## 2022-10-31 PROCEDURE — 83735 ASSAY OF MAGNESIUM: CPT

## 2022-10-31 PROCEDURE — 85025 COMPLETE CBC W/AUTO DIFF WBC: CPT

## 2022-10-31 PROCEDURE — 80048 BASIC METABOLIC PNL TOTAL CA: CPT

## 2022-10-31 PROCEDURE — 84100 ASSAY OF PHOSPHORUS: CPT

## 2022-10-31 PROCEDURE — 36415 COLL VENOUS BLD VENIPUNCTURE: CPT

## 2022-10-31 PROCEDURE — 86850 RBC ANTIBODY SCREEN: CPT

## 2022-10-31 PROCEDURE — C1889: CPT

## 2022-10-31 PROCEDURE — 82962 GLUCOSE BLOOD TEST: CPT

## 2022-10-31 PROCEDURE — 86900 BLOOD TYPING SEROLOGIC ABO: CPT

## 2022-10-31 PROCEDURE — 86901 BLOOD TYPING SEROLOGIC RH(D): CPT

## 2022-10-31 PROCEDURE — 85027 COMPLETE CBC AUTOMATED: CPT

## 2022-10-31 RX ORDER — OXYCODONE HYDROCHLORIDE 5 MG/1
1 TABLET ORAL
Qty: 12 | Refills: 0
Start: 2022-10-31 | End: 2022-11-02

## 2022-10-31 RX ORDER — POTASSIUM PHOSPHATE, MONOBASIC POTASSIUM PHOSPHATE, DIBASIC 236; 224 MG/ML; MG/ML
15 INJECTION, SOLUTION INTRAVENOUS ONCE
Refills: 0 | Status: COMPLETED | OUTPATIENT
Start: 2022-10-31 | End: 2022-10-31

## 2022-10-31 RX ORDER — DOCUSATE SODIUM 100 MG
1 CAPSULE ORAL
Qty: 14 | Refills: 0
Start: 2022-10-31 | End: 2022-11-06

## 2022-10-31 RX ADMIN — HEPARIN SODIUM 5000 UNIT(S): 5000 INJECTION INTRAVENOUS; SUBCUTANEOUS at 11:06

## 2022-10-31 RX ADMIN — Medication 15 MILLIGRAM(S): at 11:22

## 2022-10-31 RX ADMIN — Medication 1000 MILLIGRAM(S): at 12:06

## 2022-10-31 RX ADMIN — Medication 15 MILLIGRAM(S): at 11:07

## 2022-10-31 RX ADMIN — Medication 1000 MILLIGRAM(S): at 11:06

## 2022-10-31 NOTE — DISCHARGE NOTE PROVIDER - CARE PROVIDER_API CALL
Dannie Bahena)  ColonRectal Surgery  [address], [address], [place] [address]  Phone: [phone]  Fax: [phone]  Follow Up Time:

## 2022-10-31 NOTE — PROGRESS NOTE ADULT - SUBJECTIVE AND OBJECTIVE BOX
STATUS POST:  10/28 Right Hemicolectomy      SUBJECTIVE: Patient seen and examined bedside by chief resident, No ON events. No acute complaints, tolerating diet -N/-V/+F/+Bm. Patient OOBA. Pain well controlled. Denies SOB/Cp/LI    heparin   Injectable 5000 Unit(s) SubCutaneous every 12 hours      Vital Signs Last 24 Hrs  T(C): 36.8 (31 Oct 2022 05:11), Max: 37.6 (30 Oct 2022 20:10)  T(F): 98.2 (31 Oct 2022 05:11), Max: 99.7 (30 Oct 2022 20:10)  HR: 78 (31 Oct 2022 05:11) (56 - 86)  BP: 130/80 (31 Oct 2022 05:11) (117/67 - 145/76)  BP(mean): 96 (31 Oct 2022 05:11) (96 - 96)  RR: 18 (31 Oct 2022 05:11) (17 - 18)  SpO2: 96% (31 Oct 2022 05:11) (96% - 100%)    Parameters below as of 31 Oct 2022 05:11  Patient On (Oxygen Delivery Method): room air      I&O's Detail    30 Oct 2022 07:01  -  31 Oct 2022 07:00  --------------------------------------------------------  IN:    IV PiggyBack: 250 mL    Oral Fluid: 600 mL  Total IN: 850 mL    OUT:    Voided (mL): 700 mL  Total OUT: 700 mL    Total NET: 150 mL          General: NAD, resting comfortably in bed  C/V: NSR  Pulm: Nonlabored breathing, no respiratory distress  Abd: soft, NT/ND. No rebound or guarding   Incisions: C/D/I, penrose drains draining SS fluid  Extrem: WWP, no edema, SCDs in place        LABS:                        10.0   6.95  )-----------( 161      ( 31 Oct 2022 05:47 )             30.2     10-31    142  |  108  |  16  ----------------------------<  143<H>  3.8   |  26  |  0.76    Ca    8.4      31 Oct 2022 05:47  Phos  2.8     10-31  Mg     2.4     10-31            RADIOLOGY & ADDITIONAL STUDIES:

## 2022-10-31 NOTE — DISCHARGE NOTE NURSING/CASE MANAGEMENT/SOCIAL WORK - NSDCPEFALRISK_GEN_ALL_CORE
For information on Fall & Injury Prevention, visit: [email or web address] OR  [email or web address] OR  https://www.cdc.gov/steadi/patient.html

## 2022-10-31 NOTE — PROGRESS NOTE ADULT - SUBJECTIVE AND OBJECTIVE BOX
POD 3 s/p R hemicolectomy    + BM, + flatus multiple times yesterday.  Tolerating fluids and low residue diet  Voiding well.  Ambulating.    Vital Signs Last 24 Hrs  T(C): 36.8 (31 Oct 2022 09:10), Max: 37.6 (30 Oct 2022 20:10)  T(F): 98.3 (31 Oct 2022 09:10), Max: 99.7 (30 Oct 2022 20:10)  HR: 70 (31 Oct 2022 09:10) (70 - 86)  BP: 145/86 (31 Oct 2022 09:10) (127/76 - 145/86)  BP(mean): 96 (31 Oct 2022 05:11) (96 - 96)  RR: 18 (31 Oct 2022 09:10) (18 - 18)  SpO2: 96% (31 Oct 2022 09:10) (96% - 100%)    Parameters below as of 31 Oct 2022 09:10  Patient On (Oxygen Delivery Method): room air    lungs clear  abd: not distended, soft, incisions intact  ext: without calf tenderness    UO adequate                          10.0   6.95  )-----------( 161      ( 31 Oct 2022 05:47 )             30.2   10-31    142  |  108  |  16  ----------------------------<  143<H>  3.8   |  26  |  0.76    Ca    8.4      31 Oct 2022 05:47  Phos  2.8     10-31  Mg     2.4     10-31

## 2022-10-31 NOTE — DISCHARGE NOTE PROVIDER - NSDCMRMEDTOKEN_GEN_ALL_CORE_FT
Colace 100 mg oral capsule: 1 cap(s) orally 2 times a day, As Needed -for constipation     Lipitor 20 mg oral tablet: 1 tab(s) orally once a day  Tribenzor 20 mg-5 mg-12.5 mg oral tablet: 1 tab(s) orally once a day  Trulicity Pen 3 mg/0.5 mL subcutaneous solution:

## 2022-10-31 NOTE — DISCHARGE NOTE PROVIDER - NSDCFUADDINST_GEN_ALL_CORE_FT
Please followup with Dr. Bahena within 1-2 weeks, schedule an appointment by calling his office at [phone].  General Discharge Instructions:  Please resume all regular home medications unless specifically advised not to take a particular medication.   Please get plenty of rest, continue to ambulate several times per day, and drink adequate amounts of fluids. Avoid lifting weights greater than 5-10 lbs until you follow-up with your surgeon, who will instruct you further regarding activity restrictions.  Avoid driving or operating heavy machinery while taking pain medications.    Incision Care:  *Please call your doctor if you have increased pain, swelling, redness, or drainage from the incision site.  *Avoid swimming and baths until your follow-up appointment. You may shower, and wash surgical incisions with a mild soap and warm water. Gently pat the area dry.   Warning Signs  Please call your doctor if you experience the following:  *You experience chest pain, pressure, shortness of breath.  *You see bright red blood during a bowel movement.  *You have shaking chills, or fever greater than 101.5 degrees Fahrenheit or 38 degrees Celsius.    Please continue a LOW-FIBER DIET. Listed below are some foods you may eat and those you should avoid.   --Allowed foods:  White bread without nuts and seeds  White rice, plain white pasta, and crackers  Refined hot cereals, such as Cream of Wheat, or cold cereals with less than 1 gram of fiber per serving  Pancakes or waffles made from white refined flour  Most canned or well-cooked vegetables and fruits without skins or seeds  Fruit and vegetable juice with little or no pulp, fruit-flavored drinks, and flavored kohli  Tender meat, poultry, fish, eggs and tofu  Milk and foods made from milk — such as yogurt, pudding, ice cream, cheeses and sour cream — if tolerated  Butter, margarine, oils and salad dressings without seeds  --Foods to avoid:  Whole-wheat or whole-grain breads, cereals and pasta  Brown or wild rice and other whole grains, such as oats, kasha, barley and quinoa  Dried fruits and prune juice  Raw fruit, including those with seeds, skin or membranes, such as berries  Raw or undercooked vegetables, including corn  Dried beans, peas and lentils  Seeds and nuts and foods containing them, including peanut butter and other nut butters  Coconut  Popcorn    .       Please followup with Dr. Bahena within 1-2 weeks, schedule an appointment by calling his office at [phone]. Your staples will be removed in the followup appointment in office.   General Discharge Instructions:  Please resume all regular home medications unless specifically advised not to take a particular medication.   Please get plenty of rest, continue to ambulate several times per day, and drink adequate amounts of fluids. Avoid lifting weights greater than 5-10 lbs until you follow-up with your surgeon, who will instruct you further regarding activity restrictions.  Avoid driving or operating heavy machinery while taking pain medications.    Incision Care:  *Please call your doctor if you have increased pain, swelling, redness, or drainage from the incision site.  *Avoid swimming and baths until your follow-up appointment. You may shower, and wash surgical incisions with a mild soap and warm water. Gently pat the area dry.   Warning Signs  Please call your doctor if you experience the following:  *You experience chest pain, pressure, shortness of breath.  *You see bright red blood during a bowel movement.  *You have shaking chills, or fever greater than 101.5 degrees Fahrenheit or 38 degrees Celsius.    Please continue a LOW-FIBER DIET. Listed below are some foods you may eat and those you should avoid.   --Allowed foods:  White bread without nuts and seeds  White rice, plain white pasta, and crackers  Refined hot cereals, such as Cream of Wheat, or cold cereals with less than 1 gram of fiber per serving  Pancakes or waffles made from white refined flour  Most canned or well-cooked vegetables and fruits without skins or seeds  Fruit and vegetable juice with little or no pulp, fruit-flavored drinks, and flavored kohli  Tender meat, poultry, fish, eggs and tofu  Milk and foods made from milk — such as yogurt, pudding, ice cream, cheeses and sour cream — if tolerated  Butter, margarine, oils and salad dressings without seeds  --Foods to avoid:  Whole-wheat or whole-grain breads, cereals and pasta  Brown or wild rice and other whole grains, such as oats, kasha, barley and quinoa  Dried fruits and prune juice  Raw fruit, including those with seeds, skin or membranes, such as berries  Raw or undercooked vegetables, including corn  Dried beans, peas and lentils  Seeds and nuts and foods containing them, including peanut butter and other nut butters  Coconut  Popcorn    .

## 2022-10-31 NOTE — DISCHARGE NOTE NURSING/CASE MANAGEMENT/SOCIAL WORK - PATIENT PORTAL LINK FT
You can access the FollowMyHealth Patient Portal offered by [facility] by registering at the following website: [email or web address]. By joining [facility]’s FollowMyHealth portal, you will also be able to view your health information using other applications (apps) compatible with our system.

## 2022-10-31 NOTE — DISCHARGE NOTE PROVIDER - NSDCCPTREATMENT_GEN_ALL_CORE_FT
PRINCIPAL PROCEDURE  Procedure: Laparoscopic right colectomy  Findings and Treatment:       SECONDARY PROCEDURE  Procedure: Colonoscopy  Findings and Treatment:     Procedure: Lysis, adhesions, intestine  Findings and Treatment:

## 2022-10-31 NOTE — DISCHARGE NOTE PROVIDER - NSDCACTIVITY_GEN_ALL_CORE
Return to Work/School allowed/Showering allowed/Stairs allowed/Walking - Indoors allowed/No heavy lifting/straining/Walking - Outdoors allowed/Follow Instructions Provided by your Surgical Team

## 2022-10-31 NOTE — PROGRESS NOTE ADULT - ASSESSMENT
A/P  Doing well.  Tolerating diet and having some BM's and passing flatus.  Ambulating and voiding.  D/c today  Penrose wound drains to be removed prior to d/c  DPO f/u in the next week.  Tylenol and advil and/or oxycontin for pain  To call for N/V, fever, obstipation, bleeding, etc.  discussed with housestaff
A/P  Well thus far.  Using spirometer and coughing.  Ambulating with help.  NO bowel function yet.  Will allow clears  Pulmonary toilet and more walking  tylenol / toradol 15 mg q 6 x 3 days for pain with oxycontin for breakthrough.   Dr. Brennan to cover this patient tomorrow.  
This is a 58yo lady with a large adenoma polyp in the cecum. She is here for EMR/ESD and possibly Lap Right hemicolectomy. Now S/P R hemicolectomy    NPO - possibly sips and chips later  LR @ 70 cc/hr  No antibiotics  HSQ in 8 hours (10 PM) - Do 5000 U BID  Dennis till tomorrow AM  keep UOP > 25 cc/hr  IS/OOBA/SCDs  
58yo lady with a large adenoma polyp in the cecum. She is here for EMR/ESD and possibly Lap Right hemicolectomy. Now S/P R hemicolectomy 10/28    Adv to CLD   LR @ 40 cc/hr  No antibiotics  HSQ  DC Dennis  IS/OOBA/SCDs
This is a 56yo lady with a large adenoma polyp in the cecum. She is here for EMR/ESD and possibly Lap Right hemicolectomy. S/P  R hemicolectomy 10/28. No ON events, or acute complaints.     Low fiber diet  alternate tylenol and toradolol for pain and oxycodone for breakthrough pain   Remove penrose drains   keep UOP > 25 cc/hr  IS/OOBA/SCDs  HSQ   D/c home no needs
This is a 58yo lady with a large adenoma polyp in the cecum. She is here for EMR/ESD and possibly Lap Right hemicolectomy. Now S/P R hemicolectomy    advance to LRD   alternate tylenol and toradolol for pain and oxycodone for breakthrough pain   keep UOP > 25 cc/hr  IS/OOBA/SCDs  HSQ

## 2022-10-31 NOTE — DISCHARGE NOTE PROVIDER - HOSPITAL COURSE
58yo female with a  PMHx of  HTN, DMII, HLD, and large adenoma polyp in the cecum,  underwent an elective R hemicolectomy on the day of hospital admission (10/28/2022). Intraoperative, there was a small serosal tear on the small bowel that was repaired with sutures. Postoperative course was unremarkable, with advancement of diet without nausea or vomiting,  passing trial of void, pain controlled with medication, ambulating without assistance, and return of bowel function. Patients wound drains were removed without any complications (10/31). On day of discharge patient was afebrile and hemodynamically stable to be d/c'd home without any needs.     58yo female with a  PMHx of  HTN, DMII, HLD, and large adenoma polyp in the cecum,  underwent an elective R hemicolectomy on the day of hospital admission (10/28/2022). Intraoperative, there was a small serosal tear on the small bowel that was repaired with sutures. Postoperative course was unremarkable, with advancement of diet without nausea or vomiting,  passing trial of void, pain controlled with medication, ambulating without assistance, and return of bowel function. Patients wound drains were removed without any complications (10/31). On day of discharge patient was afebrile and hemodynamically stable to be d/c'd home without any needs. Patient will be discharged with abdominal staples that will be removed in office with Dr. Bahena.

## 2022-11-03 LAB — SURGICAL PATHOLOGY STUDY: SIGNIFICANT CHANGE UP

## 2022-11-07 ENCOUNTER — APPOINTMENT (OUTPATIENT)
Dept: COLORECTAL SURGERY | Facility: CLINIC | Age: 57
End: 2022-11-07

## 2022-11-07 VITALS
OXYGEN SATURATION: 99 % | HEIGHT: 66 IN | DIASTOLIC BLOOD PRESSURE: 82 MMHG | HEART RATE: 66 BPM | TEMPERATURE: 97.2 F | BODY MASS INDEX: 26.68 KG/M2 | SYSTOLIC BLOOD PRESSURE: 157 MMHG | WEIGHT: 166 LBS
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
## 2022-11-07 DIAGNOSIS — K91.71 ACCIDENTAL PUNCTURE AND LACERATION OF A DIGESTIVE SYSTEM ORGAN OR STRUCTURE DURING A DIGESTIVE SYSTEM PROCEDURE: ICD-10-CM
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
## 2022-11-07 DIAGNOSIS — K66.0 PERITONEAL ADHESIONS (POSTPROCEDURAL) (POSTINFECTION): ICD-10-CM

## 2022-11-07 DIAGNOSIS — D12.0 BENIGN NEOPLASM OF CECUM: ICD-10-CM

## 2022-11-07 DIAGNOSIS — I10 ESSENTIAL (PRIMARY) HYPERTENSION: ICD-10-CM

## 2022-11-07 DIAGNOSIS — E78.5 HYPERLIPIDEMIA, UNSPECIFIED: ICD-10-CM

## 2022-11-07 DIAGNOSIS — E11.9 TYPE 2 DIABETES MELLITUS WITHOUT COMPLICATIONS: ICD-10-CM

## 2022-11-07 PROCEDURE — 99024 POSTOP FOLLOW-UP VISIT: CPT

## 2022-11-07 RX ORDER — ERYTHROMYCIN 500 MG/1
500 TABLET, FILM COATED ORAL
Qty: 6 | Refills: 0 | Status: COMPLETED | COMMUNITY
Start: 2022-09-08 | End: 2022-11-07

## 2022-11-07 RX ORDER — METRONIDAZOLE 500 MG/1
500 TABLET ORAL
Qty: 6 | Refills: 0 | Status: COMPLETED | COMMUNITY
Start: 2022-09-06 | End: 2022-11-07

## 2022-11-10 NOTE — ASSESSMENT
[FreeTextEntry1] : s/p right  hemicolectomy, doing well.  mabowel movemets are becoming more formed. \par May advance diet.\par \par staples removed , steri strips applied.  \par For follow up in one month. \par Instructions given. \par

## 2022-11-10 NOTE — HISTORY OF PRESENT ILLNESS
[FreeTextEntry1] : 57 year lod female  s/p right hemicolectomy on 10/28 .  Doing well.  \par bowel movements are beginning to form up.  now "soft balls", once a day.  Still on low residue diet. \par Voiding well.  No issues.   Blosd sugars have been stable.

## 2022-11-10 NOTE — PHYSICAL EXAM
[Abdomen Tenderness] : ~T ~M Abdominal tenderness [Exam Deferred] : exam was deferred [Normal Breath Sounds] : Normal breath sounds [Normal Heart Sounds] : normal heart sounds [No Rash or Lesion] : No rash or lesion [Alert] : alert [Oriented to Person] : oriented to person [Oriented to Place] : oriented to place [Oriented to Time] : oriented to time [Calm] : calm [Abdomen Masses] : No abdominal masses [JVD] : no jugular venous distention  [Thyroid] : the thyroid was abnormal [de-identified] : surgical incision [de-identified] : looks well [FreeTextEntry1] : no edema [de-identified] : deferred [de-identified] : no deformities [de-identified] : midline incision  clean, well approximated, no erythema.

## 2022-12-19 ENCOUNTER — APPOINTMENT (OUTPATIENT)
Dept: COLORECTAL SURGERY | Facility: CLINIC | Age: 57
End: 2022-12-19

## 2022-12-19 VITALS
HEART RATE: 71 BPM | TEMPERATURE: 96.4 F | HEIGHT: 66 IN | WEIGHT: 164 LBS | BODY MASS INDEX: 26.36 KG/M2 | SYSTOLIC BLOOD PRESSURE: 143 MMHG | OXYGEN SATURATION: 100 % | DIASTOLIC BLOOD PRESSURE: 89 MMHG

## 2022-12-19 PROCEDURE — 99024 POSTOP FOLLOW-UP VISIT: CPT

## 2022-12-19 NOTE — HISTORY OF PRESENT ILLNESS
[FreeTextEntry1] : 58 y/o F, here for post op f/u, s/p right hemicolectomy on 10/28/22. c/o soreness on LLQ and mid incision line. Not taking pain medications. Denies fever or chills. No drainage from surgical sites. Bowel movements are once a day and soft to semi formed. Diet back to normal. Eating well and tolerating. No blood in stool.

## 2022-12-19 NOTE — REVIEW OF SYSTEMS
[Negative] : Heme/Lymph [Fever] : no fever [Chills] : no chills [Feeling Poorly] : not feeling poorly [Feeling Tired] : not feeling tired [Recent Weight Gain (___ Lbs)] : no recent weight gain [Recent Weight Loss (___ Lbs)] : no recent weight loss [Eye Pain] : no eye pain [Red Eyes] : eyes not red [Eyesight Problems] : no eyesight problems [Discharge From Eyes] : no purulent discharge from the eyes [Dry Eyes] : no dryness of the eyes [Eyes Itch] : no itching of the eyes [Earache] : no earache [Loss Of Hearing] : no hearing loss [Nosebleeds] : no nosebleeds [Nasal Discharge] : no nasal discharge [Sore Throat] : no sore throat [Hoarseness] : no hoarseness [Heart Rate Is Slow] : the heart rate was not slow [Heart Rate Is Fast] : the heart rate was not fast [Chest Pain] : no chest pain [Palpitations] : no palpitations [Leg Claudication] : no intermittent leg claudication [Lower Ext Edema] : no lower extremity edema [Shortness Of Breath] : no shortness of breath [Wheezing] : no wheezing [Cough] : no cough [SOB on Exertion] : no shortness of breath during exertion [Orthopnea] : no orthopnea [PND] : no PND [Abdominal Pain] : no abdominal pain [Vomiting] : no vomiting [Constipation] : no constipation [Diarrhea] : no diarrhea [Heartburn] : no heartburn [Melena] : no melena [Dysuria] : no dysuria [Incontinence] : no incontinence [Pelvic Pain] : no pelvic pain [Dysmenorrhea] : no dysmenorrhea [Arthralgias] : no arthralgias [Joint Swelling] : no joint swelling [Joint Stiffness] : no joint stiffness [Limb Pain] : no limb pain [Limb Swelling] : no limb swelling [Easy Bleeding] : no tendency for easy bleeding [Easy Bruising] : no tendency for easy bruising

## 2022-12-19 NOTE — PHYSICAL EXAM
[Abdomen Masses] : No abdominal masses [Abdomen Tenderness] : ~T No ~M abdominal tenderness [de-identified] : well healed wounds, no hernia or problem, not distended

## 2022-12-19 NOTE — ASSESSMENT
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

## 2023-02-15 NOTE — H&P ADULT - NSHPREVIEWOFSYSTEMS_GEN_ALL_CORE
CVS : No angina , no palpitations  Pulmonary  : No asthma, PNA, no   GI : no hepatitis, PUD  Renal no renal stones, UTI, hematuria, no dysuria  Neuro: no TIA, Seizure, or CVA  no heavy bleeding or healing issues Olumiant Counseling: I discussed with the patient the risks of Olumiant therapy including but not limited to upper respiratory tract infections, shingles, cold sores, and nausea. Live vaccines should be avoided.  This medication has been linked to serious infections; higher rate of mortality; malignancy and lymphoproliferative disorders; major adverse cardiovascular events; thrombosis; gastrointestinal perforations; neutropenia; lymphopenia; anemia; liver enzyme elevations; and lipid elevations.

## (undated) DEVICE — ELCTR BOVIE PENCIL BLADE 10FT

## (undated) DEVICE — SUT VICRYL 0 27" UR-6

## (undated) DEVICE — GELPORT LAPAROSCOPIC SYSTEM

## (undated) DEVICE — DRSG TEGADERM 4X4.75"

## (undated) DEVICE — TIP METZENBAUM SCISSOR MONOPOLAR ENDOCUT (ORANGE)

## (undated) DEVICE — PACK GENERAL LAPAROSCOPY

## (undated) DEVICE — FOLEY TRAY 16FR 5CC LF UMETER CLOSED

## (undated) DEVICE — SUT VICRYL 2-0 18" TIES

## (undated) DEVICE — SUT VICRYL 3-0 18" TIES UNDYED

## (undated) DEVICE — TUBING STRYKER PNEUMOCLEAR HIGH FLOW

## (undated) DEVICE — KIT ENDO PROCEDURE CUST W/VLV

## (undated) DEVICE — SNARE POLYP HEXAGONAL MED 27X2.4X240

## (undated) DEVICE — DRSG TEGADERM 2.5X3"

## (undated) DEVICE — SYR LUER LOK 30CC

## (undated) DEVICE — DRAIN PENROSE .25" X 18" LATEX

## (undated) DEVICE — DRAPE FLUID WARMER 44 X 66"

## (undated) DEVICE — SUT VICRYL 0 18" ENDOLOOP LIGATURE

## (undated) DEVICE — DRAPE IOBAN 33" X 23"

## (undated) DEVICE — SNARE HOT COLD 15MM DISP

## (undated) DEVICE — TROCAR COVIDIEN VERSAONE FIXATION CANNULA 5MM

## (undated) DEVICE — GLV 8 PROTEXIS (WHITE)

## (undated) DEVICE — DRAPE LEGGINGS XL

## (undated) DEVICE — MARKER ENDO SPOT EX

## (undated) DEVICE — LUBRICATING JELLY ONESHOT 1.25OZ

## (undated) DEVICE — PACK GENERAL CLOSING

## (undated) DEVICE — INSUFFLATION NDL COVIDIEN SURGINEEDLE VERESS 120MM

## (undated) DEVICE — SUT VICRYL 2-0 27" SH

## (undated) DEVICE — KNIFE DUAL J UPPER

## (undated) DEVICE — DRAPE LIGHT HANDLE COVER (BLUE)

## (undated) DEVICE — PACK GENERAL MINOR

## (undated) DEVICE — RETRACTOR LAPAROSCOPIC ALEXIS MEDIUM

## (undated) DEVICE — D HELP - CLEARVIEW CLEARIFY SYSTEM

## (undated) DEVICE — SUT VICRYL 3-0 27" SH UNDYED

## (undated) DEVICE — SUT MONOCRYL 4-0 27" PS-2 UNDYED

## (undated) DEVICE — MARKING PEN W RULER

## (undated) DEVICE — SNARE ELECTROSURG CRESENT 2.0MM X 230CM X 25MM

## (undated) DEVICE — LIGASURE MARYLAND 37CM

## (undated) DEVICE — DRSG 4 X 8

## (undated) DEVICE — DRSG 2X2

## (undated) DEVICE — PREP BETADINE SPONGE STICKS

## (undated) DEVICE — ATTACHMENT DISTAL 4X13.4MM

## (undated) DEVICE — ENDOPATH 5MM CVD SCISSOR W MONOPOLAR CAUTERY DISP

## (undated) DEVICE — FORCEP RADIAL JAW 4 W NDL 2.2MM 2.8MM 240CM ORANGE DISP

## (undated) DEVICE — Device

## (undated) DEVICE — TROCAR COVIDIEN VERSAPORT BLADELESS OPTICAL 5MM STANDARD